# Patient Record
Sex: FEMALE
[De-identification: names, ages, dates, MRNs, and addresses within clinical notes are randomized per-mention and may not be internally consistent; named-entity substitution may affect disease eponyms.]

---

## 2023-01-19 PROBLEM — Z00.00 ENCOUNTER FOR PREVENTIVE HEALTH EXAMINATION: Status: ACTIVE | Noted: 2023-01-19

## 2023-04-05 ENCOUNTER — NON-APPOINTMENT (OUTPATIENT)
Age: 43
End: 2023-04-05

## 2023-04-05 ENCOUNTER — APPOINTMENT (OUTPATIENT)
Dept: PAIN MANAGEMENT | Facility: CLINIC | Age: 43
End: 2023-04-05
Payer: COMMERCIAL

## 2023-04-05 VITALS
HEIGHT: 66 IN | WEIGHT: 275 LBS | BODY MASS INDEX: 44.2 KG/M2 | SYSTOLIC BLOOD PRESSURE: 140 MMHG | DIASTOLIC BLOOD PRESSURE: 83 MMHG | HEART RATE: 86 BPM

## 2023-04-05 PROCEDURE — 99204 OFFICE O/P NEW MOD 45 MIN: CPT

## 2023-04-05 PROCEDURE — 99072 ADDL SUPL MATRL&STAF TM PHE: CPT

## 2023-07-04 NOTE — PHYSICAL EXAM
[FreeTextEntry1] : Constitutional: No signs of distress or signs of toxicity. \par Mental Status: Alert and well oriented. Speech fluent. No aphasia. Fund of knowledge intact. \par Psychiatric: Mood stable.\par Cranial Nerve: PERRLA: No papilledema; No VFC: No Rita. V1-3 intact. No facial asymmetry, hearing grossly intact; palate elevates symmetrically, tongue midline\par Motor:No involuntary movements noted.  Adequate bulk, tone throughout, 5/5 strength of all muscle groups \par DTR: present and symmetrical; no clonus, plantars  downgoing\par Sensory: intact to primary and secondary modalities; neg Romberg\par Cerebellar: adequate finger to nose and heel to shin bilaterally.\par Gait: non antalgic or ataxic.\par Eyes: no redness or swelling\par HEENT: intact; no signs of trauma.\par Neck: No masses noted\par Pulmonary: no respiratory distress\par Vascular: no temperature, color change or sudomotor changes.; no edema\par Musculoskeletal: examination of the cervical spine reveals no midline tenderness, range of motion full upon flexion, extension and lateral rotation. Negative facet tenderness, Negative Spurlings bilaterally. examination of the lumbar spine reveals no midline or paraspinal tenderness; Range of motion full upon flexion, extension and lateral rotation; negative facet loading, No tenderness of sciatic notch, No tenderness of bilateral greater trochanters, Negative OSMIN, negative SLRT bilaterally,\par Skin: No rash.\par

## 2023-07-04 NOTE — HISTORY OF PRESENT ILLNESS
[FreeTextEntry1] : This is a case of a  42  -year-old right-handed [place] with a past medical history of  HTN  who presents with a chief complaint of HEADACHES  \par \par Headaches began 13 years ago, onset without inciting event, while at work as a . Overall, headaches have remained the same described as a needle sensation sticking along the side of head,. At times lasts seconds or 15 minutes minutes 1-2 times to 5-6 per day. No trigger. Over the course of years it has progressed to even to included both sides as if they are squeezing a melon". She also feels like a gilloutine into her left eye. Associated nausea with phot and phonophobia.  No eye tearing or nasal congestion\par No Pulsatile tinnitus or loss of vision. \par Burning incessant bilateral ear pain for weeks. tender to lie down.\par At times. headache interfering with headache. unable to not work 2 times per last 6 months. \par Since March 2020 - receiving BOTOX - tried propranolol; thinks topamax; Does not recall if tried Indocin . \par Duloxetine 90 mgs qd. Occipital nerve bloc with some relief, never tied magnesium\par Over 10 years gained  pounds \par Triggers: None\par Comorbidities: depression, insomnia \par Other pain conditions: None\par Imaging: MRI brain; NA\par Medications: None\par Interventions: None\par CAM therapies: Acupuncture, massage; counseling. \par Family history: Maternal grandmother \par Allergies: PCN rash\par

## 2023-07-04 NOTE — ASSESSMENT
[FreeTextEntry1] : This is a case of 43-year-old woman who appears to be suffering from a paroxysmal hemicrania type picture.  However she does not have any of the autonomic associated signs.  Her examination essentially nonfocal.\par We discussed the use of indomethacin 25 mg 1 twice daily to be gradually increased depending upon her response.  She is advised potential risk and adverse effects on this treatment.  She is not a candidate for Botox at this time because of her infrequent and short lasting headaches I do not feel like criteria for migraine